# Patient Record
Sex: FEMALE | Race: BLACK OR AFRICAN AMERICAN | Employment: FULL TIME | URBAN - METROPOLITAN AREA
[De-identification: names, ages, dates, MRNs, and addresses within clinical notes are randomized per-mention and may not be internally consistent; named-entity substitution may affect disease eponyms.]

---

## 2018-01-25 ENCOUNTER — OFFICE VISIT (OUTPATIENT)
Dept: FAMILY MEDICINE CLINIC | Age: 23
End: 2018-01-25

## 2018-01-25 VITALS
BODY MASS INDEX: 22.36 KG/M2 | DIASTOLIC BLOOD PRESSURE: 77 MMHG | RESPIRATION RATE: 14 BRPM | SYSTOLIC BLOOD PRESSURE: 110 MMHG | HEIGHT: 65 IN | WEIGHT: 134.2 LBS | HEART RATE: 82 BPM | TEMPERATURE: 98.6 F | OXYGEN SATURATION: 98 %

## 2018-01-25 DIAGNOSIS — Z00.00 WELL ADULT EXAM: Primary | ICD-10-CM

## 2018-01-25 LAB
HCG URINE, QL. (POC): NEGATIVE
VALID INTERNAL CONTROL?: YES

## 2018-01-25 NOTE — PROGRESS NOTES
.  Chief Complaint   Patient presents with    Establish Care    Contraception    Knee Pain     . Jerry Leigh

## 2018-01-25 NOTE — PROGRESS NOTES
HPI  Aimee Valenzuela is a 25 y.o. female who presents to [facility]. It has been quite a while since her last doctor's visit. She thinks she was in middle school. She wants to make sure she is healthy. Is having occasional knee pain. Sprained her right knee a little school and now it bothers her if she crouches for too long. Ice helps Motrin else. The pain is only about an hour at its longest duration. Does not pop click and does not feel unstable. Hurts about every time she runs. Like to discuss contraception. She is sexually active, monogamous relationship. Currently using condoms for birth control. Has never been on any kind of birth control before. She is currently employed at an [employer]. Would like to go to college and wants to be a     PMHx:  Past Medical History:   Diagnosis Date    Cancer ([facility] [address]     skin tag removed pt states it was malignant       Meds:       Allergies:   Not on File    Smoker:  History   Smoking Status    Never Smoker   Smokeless Tobacco    Never Used       ETOH:   History   Alcohol Use    Yes       FH: No family history on file. ROS:   As listed in HPI. In addition:  Constitutional:   No headache, fever, fatigue, weight loss or weight gain      Cardiac:    No chest pain      Resp:   No cough or shortness of breath      Neuro   No loss of consciousness, dizziness, seizures      Physical Exam:  Blood pressure 110/77, pulse 82, temperature 98.6 °F (37 °C), resp. rate 14, height 5' 5\" (1.651 m), weight 134 lb 3.2 oz (60.9 kg), last menstrual period 01/08/2018, SpO2 98 %. GEN: No apparent distress. Alert and oriented and responds to all questions appropriately. NEUROLOGIC:  No focal neurologic deficits. Strength and sensation grossly intact. Coordination and gait grossly intact. EXT: Well perfused. No edema. SKIN: No obvious rashes.   Lungs clear to auscultation bilaterally  CV regular rate and rhythm no murmur  HEENT unremarkable except for a deviated septum to the right and mildly congested right nasal mucosa. No postnasal drip and shotty lymphadenopathy       Assessment and Plan     Well adult  Surveillance labs at her request    Need for contraception  We talked about her options and she would like to try IUD. She has done her own research and had some insightful questions about this. She is a good candidate but would need to be referred to GYN for this. She is also due for her first Pap smear which would be done in conjunction with the IUD. She is fairly sure she has not had her HPV shots. She does not know what vaccines she has had so we will recheck out to her former PCP. Consent signed. She changed her name in the last few years so will need to keep a look out for these records      ICD-10-CM ICD-9-CM    1. Well adult exam Z00.00 V70.0 AMB POC URINE PREGNANCY TEST, VISUAL COLOR COMPARISON      REFERRAL TO OBSTETRICS AND GYNECOLOGY      METABOLIC PANEL, COMPREHENSIVE      CBC WITH AUTOMATED DIFF      LIPID PANEL       AVS given.  Pt expressed understanding of instructions

## 2018-01-25 NOTE — PATIENT INSTRUCTIONS
Meniscus: Exercises  Your Care Instructions  Here are some examples of typical rehabilitation exercises for your condition. Start each exercise slowly. Ease off the exercise if you start to have pain. Your doctor or physical therapist will tell you when you can start these exercises and which ones will work best for you. How to do the exercises  Calf wall stretch    1. Stand facing a wall with your hands on the wall at about eye level. Put your affected leg about a step behind your other leg. 2. Keeping your back leg straight and your back heel on the floor, bend your front knee and gently bring your hip and chest toward the wall until you feel a stretch in the calf of your back leg. 3. Hold the stretch for at least 15 to 30 seconds. 4. Repeat 2 to 4 times. 5. Repeat steps 1 through 4, but this time keep your back knee bent. Hamstring wall stretch    1. Lie on your back in a doorway, with your good leg through the open door. 2. Slide your affected leg up the wall to straighten your knee. You should feel a gentle stretch down the back of your leg. 1. Do not arch your back. 2. Do not bend either knee. 3. Keep one heel touching the floor and the other heel touching the wall. Do not point your toes. 3. Hold the stretch for at least 1 minute. Then over time, try to lengthen the time you hold the stretch to as long as 6 minutes. 4. Repeat 2 to 4 times. 5. If you do not have a place to do this exercise in a doorway, there is another way to do it:  6. Lie on your back, and bend your affected leg. 7. Loop a towel under the ball and toes of that foot, and hold the ends of the towel in your hands. 8. Straighten your knee, and slowly pull back on the towel. You should feel a gentle stretch down the back of your leg. 9. Hold the stretch for at least 15 to 30 seconds. Or even better, hold the stretch for 1 minute if you can. 10. Repeat 2 to 4 times. Quad sets    1.  Sit with your leg straight and supported on the floor or a firm bed. (If you feel discomfort in the front or back of your knee, place a small towel roll under your knee.)  2. Tighten the muscles on top of your thigh by pressing the back of your knee flat down to the floor. (If you feel discomfort under your kneecap, place a small towel roll under your knee.)  3. Hold for about 6 seconds, then rest up to 10 seconds. 4. Do 8 to 12 repetitions several times a day. Straight-leg raises to the front    1. Lie on your back with your good knee bent so that your foot rests flat on the floor. Your injured leg should be straight. Make sure that your low back has a normal curve. You should be able to slip your flat hand in between the floor and the small of your back, with your palm touching the floor and your back touching the back of your hand. 2. Tighten the thigh muscles in the injured leg by pressing the back of your knee flat down to the floor. Hold your knee straight. 3. Keeping the thigh muscles tight, lift your injured leg up so that your heel is about 12 inches off the floor. Hold for 5 seconds and then lower slowly. 4. Do 8 to 12 repetitions. Straight-leg raises to the back    1. Lie on your stomach, and lift your leg straight up behind you (toward the ceiling). 2. Lift your toes about 6 inches off the floor, hold for about 6 seconds, then lower slowly. 3. Do 8 to 12 repetitions. Hamstring curls    1. Lie on your stomach with your knees straight. If your kneecap is uncomfortable, roll up a washcloth and put it under your leg just above your kneecap. 2. Lift the foot of your injured leg by bending the knee so that you bring the foot up toward your buttock. If this motion hurts, try it without bending your knee quite as far. This may help you avoid any painful motion. 3. Slowly lower your leg back to the floor. 4. Do 8 to 12 repetitions.   5. With permission from your doctor or physical therapist, you may also want to add a cuff weight to your ankle (not more than 5 pounds). With weight, you do not have to lift your leg more than 12 inches to get a hamstring workout. Wall slide with ball squeeze    1. Stand with your back against a wall and with your feet about shoulder-width apart. Your feet should be about 12 inches away from the wall. 2. Put a ball about the size of a soccer ball between your knees. Then slowly slide down the wall until your knees are bent about 20 to 30 degrees. 3. Tighten your thigh muscles by squeezing the ball between your knees. Hold that position for about 10 seconds, then stop squeezing. Rest for up to 10 seconds between repetitions. 4. Repeat 8 to 12 times. Heel raises    1. Stand with your feet a few inches apart, with your hands lightly resting on a counter or chair in front of you. 2. Slowly raise your heels off the floor while keeping your knees straight. 3. Hold for about 6 seconds, then slowly lower your heels to the floor. 4. Do 8 to 12 repetitions several times during the day. Heel dig bridging    Stop doing this exercise if it causes pain. 1. Lie on your back with both knees bent and your ankles bent so that only your heels are digging into the floor. Your knees should be bent about 90 degrees. 2. Then push your heels into the floor, squeeze your buttocks, and lift your hips off the floor until your shoulders, hips, and knees are all in a straight line. 3. Hold for about 6 seconds as you continue to breathe normally, and then slowly lower your hips back down to the floor and rest for up to 10 seconds. 4. Do 8 to 12 repetitions. Shallow standing knee bends    Do this exercise only if you have very little pain; if you have no clicking, locking, or giving way in the injured knee; and if it does not hurt while you are doing 8 to 12 repetitions. 1. Stand with your hands lightly resting on a counter or chair in front of you. Put your feet shoulder-width apart.   2. Slowly bend your knees so that you squat down like you are going to sit in a chair. Make sure your knees do not go in front of your toes. 3. Lower yourself about 6 inches. Your heels should remain on the floor at all times. 4. Rise slowly to a standing position. Follow-up care is a key part of your treatment and safety. Be sure to make and go to all appointments, and call your doctor if you are having problems. It's also a good idea to know your test results and keep a list of the medicines you take. Where can you learn more? Go to http://andrew-isela.info/. Enter C183 in the search box to learn more about \"Meniscus Tear: Exercises. \"  Current as of: March 21, 2017  Content Version: 11.4  © 9919-1712 Healthwise, [employer]. Care instructions adapted under license by [employer] (which disclaims liability or warranty for this information). If you have questions about a medical condition or this instruction, always ask your healthcare professional. [address] any warranty or liability for your use of this information.

## 2018-01-25 NOTE — MR AVS SNAPSHOT
[facility] 
 
 
 [address] [place] [address] 
[phone] Patient: Rd Naik MRN: [id number] :1995 Visit Information Date & Time Provider Department Dept. Phone Encounter #  
 2018  1:00 PM Jose Miguel Herrera MD [address] [facility] [phone] [id number] Upcoming Health Maintenance Date Due  
 HPV AGE 9Y-34Y (1 of 3 - Female 3 Dose Series) 2007 DTaP/Tdap/Td series (1 - Tdap) 2017 PAP AKA CERVICAL CYTOLOGY 2017 Influenza Age 5 to Adult 2017 Allergies as of 2018  Review Complete On: 2018 By: Jose Miguel Herrera MD  
 Not on File Current Immunizations  Never Reviewed No immunizations on file. Not reviewed this visit You Were Diagnosed With   
  
 Codes Comments Well adult exam    -  Primary ICD-10-CM: Z00.00 ICD-9-CM: V70.0 Vitals BP Pulse Temp Resp Height(growth percentile) Weight(growth percentile) 110/77 (BP 1 Location: Right arm, BP Patient Position: Sitting) 82 98.6 °F (37 °C) 14 5' 5\" (1.651 m) 134 lb 3.2 oz (60.9 kg) LMP SpO2 BMI Smoking Status 2018 98% 22.33 kg/m2 Never Smoker BMI and BSA Data Body Mass Index Body Surface Area  
 22.33 kg/m 2 1.67 m 2 Preferred Pharmacy Pharmacy Name Phone [address] [phone] Your Updated Medication List  
  
Notice  As of 2018  1:55 PM  
 You have not been prescribed any medications. We Performed the Following AMB POC URINE PREGNANCY TEST, VISUAL COLOR COMPARISON [98193 CPT(R)] CBC WITH AUTOMATED DIFF [91346 CPT(R)] LIPID PANEL [38661 CPT(R)] METABOLIC PANEL, COMPREHENSIVE [18889 CPT(R)] REFERRAL TO OBSTETRICS AND GYNECOLOGY [REF51 Custom] Referral Information Referral ID Referred By Referred To  
  
 [id number] Valdo SCHAEFER [address] [place], [address] [facility] Visits Status Start Date End Date 1 New Request 1/25/18 1/25/19 If your referral has a status of pending review or denied, additional information will be sent to support the outcome of this decision. Patient Instructions Meniscus: Exercises Your Care Instructions Here are some examples of typical rehabilitation exercises for your condition. Start each exercise slowly. Ease off the exercise if you start to have pain. Your doctor or physical therapist will tell you when you can start these exercises and which ones will work best for you. How to do the exercises Calf wall stretch 1. Stand facing a wall with your hands on the wall at about eye level. Put your affected leg about a step behind your other leg. 2. Keeping your back leg straight and your back heel on the floor, bend your front knee and gently bring your hip and chest toward the wall until you feel a stretch in the calf of your back leg. 3. Hold the stretch for at least 15 to 30 seconds. 4. Repeat 2 to 4 times. 5. Repeat steps 1 through 4, but this time keep your back knee bent. Hamstring wall stretch 1. Lie on your back in a doorway, with your good leg through the open door. 2. Slide your affected leg up the wall to straighten your knee. You should feel a gentle stretch down the back of your leg. 1. Do not arch your back. 2. Do not bend either knee. 3. Keep one heel touching the floor and the other heel touching the wall. Do not point your toes. 3. Hold the stretch for at least 1 minute. Then over time, try to lengthen the time you hold the stretch to as long as 6 minutes. 4. Repeat 2 to 4 times. 5. If you do not have a place to do this exercise in a doorway, there is another way to do it: 6. Lie on your back, and bend your affected leg. 7. Loop a towel under the ball and toes of that foot, and hold the ends of the towel in your hands. 8. Straighten your knee, and slowly pull back on the towel. You should feel a gentle stretch down the back of your leg. 9. Hold the stretch for at least 15 to 30 seconds. Or even better, hold the stretch for 1 minute if you can. 10. Repeat 2 to 4 times. [facility] 1. Sit with your leg straight and supported on the floor or a firm bed. (If you feel discomfort in the front or back of your knee, place a small towel roll under your knee.) 2. Tighten the muscles on top of your thigh by pressing the back of your knee flat down to the floor. (If you feel discomfort under your kneecap, place a small towel roll under your knee.) 3. Hold for about 6 seconds, then rest up to 10 seconds. 4. Do 8 to 12 repetitions several times a day. Straight-leg raises to the front 1. Lie on your back with your good knee bent so that your foot rests flat on the floor. Your injured leg should be straight. Make sure that your low back has a normal curve. You should be able to slip your flat hand in between the floor and the small of your back, with your palm touching the floor and your back touching the back of your hand. 2. Tighten the thigh muscles in the injured leg by pressing the back of your knee flat down to the floor. Hold your knee straight. 3. Keeping the thigh muscles tight, lift your injured leg up so that your heel is about 12 inches off the floor. Hold for 5 seconds and then lower slowly. 4. Do 8 to 12 repetitions. Straight-leg raises to the back 1. Lie on your stomach, and lift your leg straight up behind you (toward the ceiling). 2. Lift your toes about 6 inches off the floor, hold for about 6 seconds, then lower slowly. 3. Do 8 to 12 repetitions. Hamstring curls 1. Lie on your stomach with your knees straight. If your kneecap is uncomfortable, roll up a washcloth and put it under your leg just above your kneecap.  
2. Lift the foot of your injured leg by bending the knee so that you bring the foot up toward your buttock. If this motion hurts, try it without bending your knee quite as far. This may help you avoid any painful motion. 3. Slowly lower your leg back to the floor. 4. Do 8 to 12 repetitions. 5. With permission from your doctor or physical therapist, you may also want to add a cuff weight to your ankle (not more than 5 pounds). With weight, you do not have to lift your leg more than 12 inches to get a hamstring workout. Wall slide with ball squeeze 1. Stand with your back against a wall and with your feet about shoulder-width apart. Your feet should be about 12 inches away from the wall. 2. Put a ball about the size of a soccer ball between your knees. Then slowly slide down the wall until your knees are bent about 20 to 30 degrees. 3. Tighten your thigh muscles by squeezing the ball between your knees. Hold that position for about 10 seconds, then stop squeezing. Rest for up to 10 seconds between repetitions. 4. Repeat 8 to 12 times. Heel raises 1. Stand with your feet a few inches apart, with your hands lightly resting on a counter or chair in front of you. 2. Slowly raise your heels off the floor while keeping your knees straight. 3. Hold for about 6 seconds, then slowly lower your heels to the floor. 4. Do 8 to 12 repetitions several times during the day. Heel dig bridging Stop doing this exercise if it causes pain. 1. Lie on your back with both knees bent and your ankles bent so that only your heels are digging into the floor. Your knees should be bent about 90 degrees. 2. Then push your heels into the floor, squeeze your buttocks, and lift your hips off the floor until your shoulders, hips, and knees are all in a straight line. 3. Hold for about 6 seconds as you continue to breathe normally, and then slowly lower your hips back down to the floor and rest for up to 10 seconds. 4. Do 8 to 12 repetitions. Shallow standing knee bends Do this exercise only if you have very little pain; if you have no clicking, locking, or giving way in the injured knee; and if it does not hurt while you are doing 8 to 12 repetitions. 1. Stand with your hands lightly resting on a counter or chair in front of you. Put your feet shoulder-width apart. 2. Slowly bend your knees so that you squat down like you are going to sit in a chair. Make sure your knees do not go in front of your toes. 3. Lower yourself about 6 inches. Your heels should remain on the floor at all times. 4. Rise slowly to a standing position. Follow-up care is a key part of your treatment and safety. Be sure to make and go to all appointments, and call your doctor if you are having problems. It's also a good idea to know your test results and keep a list of the medicines you take. Where can you learn more? Go to http://andrew-isela.info/. Enter C183 in the search box to learn more about \"Meniscus Tear: Exercises. \" Current as of: March 21, 2017 Content Version: 11.4 © 5959-6792 [employer]. Care instructions adapted under license by [employer] (which disclaims liability or warranty for this information). If you have questions about a medical condition or this instruction, always ask your healthcare professional. [address] any warranty or liability for your use of this information. Introducing [facility] HE[facility]! [facility] introduces [employer] patient portal. Now you can access parts of your medical record, email your doctor's office, and request medication refills online. 1. In your internet browser, go to [email or web address] 2. Click on the First Time User? Click Here link in the Sign In box. You will see the New Member Sign Up page. 3. Enter your [employer] Access Code exactly as it appears below. You will not need to use this code after youve completed the sign-up process.  If you do not sign up before the expiration date, you must request a new code. · Giftbar Access Code: [email or web address] Expires: 4/25/2018  1:03 PM 
 
4. Enter the last four digits of your Social Security Number (xxxx) and Date of Birth (mm/dd/yyyy) as indicated and click Submit. You will be taken to the next sign-up page. 5. Create a Giftbar ID. This will be your Giftbar login ID and cannot be changed, so think of one that is secure and easy to remember. 6. Create a Giftbar password. You can change your password at any time. 7. Enter your Password Reset Question and Answer. This can be used at a later time if you forget your password. 8. Enter your e-mail address. You will receive e-mail notification when new information is available in [address]. 9. Click Sign Up. You can now view and download portions of your medical record. 10. Click the Download Summary menu link to download a portable copy of your medical information. If you have questions, please visit the Frequently Asked Questions section of the Giftbar website. Remember, Giftbar is NOT to be used for urgent needs. For medical emergencies, dial 911. Now available from your iPhone and Android! Please provide this summary of care documentation to your next provider. If you have any questions after today's visit, please call [phone].

## 2018-01-26 LAB
ALBUMIN SERPL-MCNC: 4.4 G/DL (ref 3.5–5.5)
ALBUMIN/GLOB SERPL: 1.2 {RATIO} (ref 1.2–2.2)
ALP SERPL-CCNC: 66 IU/L (ref 39–117)
ALT SERPL-CCNC: 17 IU/L (ref 0–32)
AST SERPL-CCNC: 19 IU/L (ref 0–40)
BASOPHILS # BLD AUTO: 0 X10E3/UL (ref 0–0.2)
BASOPHILS NFR BLD AUTO: 0 %
BILIRUB SERPL-MCNC: 0.3 MG/DL (ref 0–1.2)
BUN SERPL-MCNC: 7 MG/DL (ref 6–20)
BUN/CREAT SERPL: 11 (ref 9–23)
CALCIUM SERPL-MCNC: 9.6 MG/DL (ref 8.7–10.2)
CHLORIDE SERPL-SCNC: 99 MMOL/L (ref 96–106)
CHOLEST SERPL-MCNC: 182 MG/DL (ref 100–199)
CO2 SERPL-SCNC: 27 MMOL/L (ref 18–29)
CREAT SERPL-MCNC: 0.65 MG/DL (ref 0.57–1)
EOSINOPHIL # BLD AUTO: 0.2 X10E3/UL (ref 0–0.4)
EOSINOPHIL NFR BLD AUTO: 2 %
ERYTHROCYTE [DISTWIDTH] IN BLOOD BY AUTOMATED COUNT: 13.2 % (ref 12.3–15.4)
GFR SERPLBLD CREATININE-BSD FMLA CKD-EPI: 127 ML/MIN/1.73
GFR SERPLBLD CREATININE-BSD FMLA CKD-EPI: 147 ML/MIN/1.73
GLOBULIN SER CALC-MCNC: 3.6 G/DL (ref 1.5–4.5)
GLUCOSE SERPL-MCNC: 81 MG/DL (ref 65–99)
HCT VFR BLD AUTO: 39.4 % (ref 34–46.6)
HDLC SERPL-MCNC: 64 MG/DL
HGB BLD-MCNC: 12.1 G/DL (ref 11.1–15.9)
IMM GRANULOCYTES # BLD: 0 X10E3/UL (ref 0–0.1)
IMM GRANULOCYTES NFR BLD: 0 %
INTERPRETATION, 910389: NORMAL
LDLC SERPL CALC-MCNC: 107 MG/DL (ref 0–99)
LYMPHOCYTES # BLD AUTO: 1.9 X10E3/UL (ref 0.7–3.1)
LYMPHOCYTES NFR BLD AUTO: 24 %
MCH RBC QN AUTO: 28.1 PG (ref 26.6–33)
MCHC RBC AUTO-ENTMCNC: 30.7 G/DL (ref 31.5–35.7)
MCV RBC AUTO: 92 FL (ref 79–97)
MONOCYTES # BLD AUTO: 0.9 X10E3/UL (ref 0.1–0.9)
MONOCYTES NFR BLD AUTO: 11 %
NEUTROPHILS # BLD AUTO: 5.2 X10E3/UL (ref 1.4–7)
NEUTROPHILS NFR BLD AUTO: 63 %
PLATELET # BLD AUTO: 298 X10E3/UL (ref 150–379)
POTASSIUM SERPL-SCNC: 4.3 MMOL/L (ref 3.5–5.2)
PROT SERPL-MCNC: 8 G/DL (ref 6–8.5)
RBC # BLD AUTO: 4.3 X10E6/UL (ref 3.77–5.28)
SODIUM SERPL-SCNC: 139 MMOL/L (ref 134–144)
TRIGL SERPL-MCNC: 54 MG/DL (ref 0–149)
VLDLC SERPL CALC-MCNC: 11 MG/DL (ref 5–40)
WBC # BLD AUTO: 8.2 X10E3/UL (ref 3.4–10.8)

## 2021-01-05 ENCOUNTER — OFFICE VISIT (OUTPATIENT)
Dept: PRIMARY CARE CLINIC | Age: 26
End: 2021-01-05
Payer: COMMERCIAL

## 2021-01-05 VITALS
TEMPERATURE: 97 F | HEART RATE: 106 BPM | SYSTOLIC BLOOD PRESSURE: 118 MMHG | OXYGEN SATURATION: 98 % | DIASTOLIC BLOOD PRESSURE: 68 MMHG

## 2021-01-05 DIAGNOSIS — R05.9 COUGH: ICD-10-CM

## 2021-01-05 DIAGNOSIS — Z13.89 SCREENING FOR CARDIOVASCULAR, RESPIRATORY, AND GENITOURINARY DISEASES: Primary | ICD-10-CM

## 2021-01-05 DIAGNOSIS — Z13.6 SCREENING FOR CARDIOVASCULAR, RESPIRATORY, AND GENITOURINARY DISEASES: Primary | ICD-10-CM

## 2021-01-05 DIAGNOSIS — Z13.83 SCREENING FOR CARDIOVASCULAR, RESPIRATORY, AND GENITOURINARY DISEASES: Primary | ICD-10-CM

## 2021-01-05 DIAGNOSIS — R68.83 CHILLS (WITHOUT FEVER): ICD-10-CM

## 2021-01-05 PROCEDURE — 99202 OFFICE O/P NEW SF 15 MIN: CPT | Performed by: NURSE PRACTITIONER

## 2021-01-05 NOTE — PROGRESS NOTES
Mary Ellen Kimble is a 26 y.o. female who was seen in clinic today (1/5/2021) for an acute visit. Assessment/Plan:            * COVID-19 sample collected and submitted       * Patient given detailed CDC instructions contained within After Visit Summary    Diagnoses and all orders for this visit:    1. Screening for cardiovascular, respiratory, and genitourinary diseases  -     NOVEL CORONAVIRUS (COVID-19)    2. Cough    3. Chills (without fever)       known covid exposure. Discussed expected course/resolution/complications of diagnosis in detail with patient. Advised pt on CDC guidance, OTC medications for symptom management, red flags reviewed and should develop to seek emergency medical attn. Reviewed with her that COVID-19 pandemic is an evolving situation with rapidly changing recommendations & guidelines, continue to practice hand hygiene, social distancing, wearing of facial coverings. Regardless of testing results, should still follow CDC guidelines as there is a chance of a false negative, such as a poor sample collection or being too soon to test after exposure. Medical decisions are made based on the the best information available at the time. Recommended she stay tuned for updates published by trusted sources and to advise your PCP of any unexpected changes in clinical condition     Subjective:   Fanny was seen today for Concern For COVID-19 (Coronavirus)  Subjective Fever; Chills;Cough; Abdominal pain;Muscle pain;Severe headache; Fatigue;Diarrhea. Has been taking tylenol cold and flu with some relief of symptoms. She denies a recent history/current: loss of smell/taste, objective fever, wheezing, SOB, and MILTON. Non user of tob. Travel Screening     Question   Response    In the last month, have you been in contact with someone who was confirmed or suspected to have Coronavirus / COVID-19? No / Unsure    Have you had a COVID-19 viral test in the last 14 days?   No    Do you have any of the following new or worsening symptoms? Fever; Chills;Cough; Abdominal pain;Muscle pain;Severe headache; Fatigue;Diarrhea    Have you traveled internationally in the last month? No      Travel History   Travel since 12/05/20     No documented travel since 12/05/20          ROS - Pertinent items are noted in HPI    There is no problem list on file for this patient. Home Medications    Not on File      Not on File       Objective:   Physical Exam  General:  alert, cooperative, acutely ill   Ears: Normal external ear canals AU   Sinuses: Normal paranasal sinuses without tenderness   Neck: supple, symmetrical, trachea midline. Heart: normal rate, regular rhythm   Lungs: No dyspneic or audible respiratory distress. Visit Vitals  /68 (BP 1 Location: Left arm, BP Patient Position: Sitting)   Pulse (!) 106   Temp 97 °F (36.1 °C)   SpO2 98%         Disclaimer:        Medication risks/benefits/costs/interactions/alternatives discussed with patient. She was given an after visit summary which includes diagnoses, current medications, & vitals. She expressed understanding with the diagnosis and plan. Aspects of this note may have been generated using voice recognition software. Despite editing, there may be some syntax errors.        Bismark Ragsdale NP

## 2021-01-07 LAB — SARS-COV-2, NAA: NOT DETECTED

## 2021-05-20 ENCOUNTER — OFFICE VISIT (OUTPATIENT)
Dept: URGENT CARE | Age: 26
End: 2021-05-20
Payer: COMMERCIAL

## 2021-05-20 VITALS
SYSTOLIC BLOOD PRESSURE: 124 MMHG | DIASTOLIC BLOOD PRESSURE: 83 MMHG | RESPIRATION RATE: 18 BRPM | OXYGEN SATURATION: 100 % | TEMPERATURE: 98.2 F | HEART RATE: 80 BPM

## 2021-05-20 DIAGNOSIS — Z20.2 EXPOSURE TO CHLAMYDIA: Primary | ICD-10-CM

## 2021-05-20 PROCEDURE — S9083 URGENT CARE CENTER GLOBAL: HCPCS | Performed by: FAMILY MEDICINE

## 2021-05-20 RX ORDER — LEVONORGESTREL 19.5 MG/1
INTRAUTERINE DEVICE INTRAUTERINE
COMMUNITY

## 2021-05-20 RX ORDER — AZITHROMYCIN 250 MG/1
1000 TABLET, FILM COATED ORAL
Qty: 4 TABLET | Refills: 0 | Status: SHIPPED | OUTPATIENT
Start: 2021-05-20 | End: 2021-05-20

## 2021-05-20 NOTE — PROGRESS NOTES
Donny Wilson is a 23 y.o. female who presents with exposure to Chlamydia, was notified by partner yesterday. Denies abnormal vaginal d/c, abdominal pain, N/V, fever. The history is provided by the patient. Past Medical History:   Diagnosis Date    Cancer [facility])     skin tag removed pt states it was malignant        History reviewed. No pertinent surgical history. History reviewed. No pertinent family history. Social History     Socioeconomic History    Marital status: SINGLE     Spouse name: Not on file    Number of children: Not on file    Years of education: Not on file    Highest education level: Not on file   Occupational History    Not on file   Tobacco Use    Smoking status: Never Smoker    Smokeless tobacco: Never Used   Substance and Sexual Activity    Alcohol use: Yes    Drug use: No    Sexual activity: Yes   Other Topics Concern    Not on file   Social History Narrative    Not on file     Social Determinants of Health     Financial Resource Strain:     Difficulty of Paying Living Expenses:    Food Insecurity:     Worried About Running Out of Food in the Last Year:     [address] in the Last Year:    Transportation Needs:     Lack of Transportation (Medical):  Lack of Transportation (Non-Medical):    Physical Activity:     Days of Exercise per Week:     Minutes of Exercise per Session:    Stress:     Feeling of Stress :    Social Connections:     Frequency of Communication with Friends and Family:     Frequency of Social Gatherings with Friends and Family:     Attends [employer] Services:     Active Member of Clubs or Organizations:     Attends Club or Organization Meetings:     Marital Status:    Intimate Partner Violence:     Fear of Current or Ex-Partner:     Emotionally Abused:     Physically Abused:     Sexually Abused: ALLERGIES: Patient has no known allergies. Review of Systems   Constitutional: Negative for fever. Gastrointestinal: Negative for abdominal pain, nausea and vomiting. Genitourinary: Negative for vaginal discharge and vaginal pain. Vitals:    05/20/21 1056   BP: 124/83   Pulse: 80   Resp: 18   Temp: 98.2 °F (36.8 °C)   SpO2: 100%       Physical Exam  Vitals and nursing note reviewed. Constitutional:       General: She is not in acute distress. Appearance: She is well-developed. She is not diaphoretic. Pulmonary:      Effort: Pulmonary effort is normal.   Neurological:      Mental Status: She is alert. Psychiatric:         Behavior: Behavior normal.         Thought Content: Thought content normal.         Judgment: Judgment normal.         MDM    ICD-10-CM ICD-9-CM   1. Exposure to chlamydia  Z20.2 V01.6       Orders Placed This Encounter    CT/NG/T.VAGINALIS AMPLIFICATION     Standing Status:   Future     Number of Occurrences:   1     Standing Expiration Date:   11/20/2021     Order Specific Question:   Specimen source     Answer:   Vaginal [516]    DISCONTD: azithromycin (ZITHROMAX) 250 mg tablet     Sig: Take 4 Tablets by mouth now for 1 dose. Dispense:  4 Tablet     Refill:  0    azithromycin (ZITHROMAX) 250 mg tablet     Sig: Take 4 Tablets by mouth now for 1 dose. Dispense:  4 Tablet     Refill:  0      Take Azithromycin as directed  Patient self swabbed; patient reports abnormal discharge noted after swabbing    If signs and symptoms become worse the pt is to go to the ER.          Procedures

## 2021-05-20 NOTE — PATIENT INSTRUCTIONS
Chlamydia: Care Instructions Your Care Instructions Chlamydia is a bacterial infection spread through sexual contact. It's one of the most common sexually transmitted infections (STIs). Most people who get chlamydia don't have symptoms. But they can still infect their sex partners. If chlamydia in women is not treated, it can cause pelvic inflammatory disease (PID), a severe pelvic infection. PID can make it hard for a woman to get pregnant. Antibiotics can cure chlamydia. Your sex partner or partners also need treatment to keep from spreading the infection. Tell your doctor if you might be pregnant. Certain antibiotics should not be used in pregnancy. Follow-up care is a key part of your treatment and safety. Be sure to make and go to all appointments, and call your doctor if you are having problems. It's also a good idea to know your test results and keep a list of the medicines you take. How can you care for yourself at home? · Chlamydia often is treated with a single dose of antibiotics in the doctor's office. If your doctor prescribed antibiotics to take at home, take them as directed. Do not stop taking them just because you feel better. You need to take the full course of antibiotics. · Do not have sex with anyone while you are being treated. If your treatment is a single dose of antibiotics, wait at least 7 days after you take the dose before you have sex. Even if you use a condom, you and your partner may pass the infection back and forth. · Make sure to tell your sex partner or partners that you have chlamydia. They should get treated, even if they do not have symptoms. · Your doctor may have done tests for other STIs. · Your doctor may advise you to be tested again for chlamydia in 3 or 4 months. How can you prevent it? It's easier to prevent an STI than it is to treat one: · Limit your sex partners. The safest sex is with one partner who has sex only with you.  
· Talk with your partner or partners about STIs before you have sex. Find out if they are at risk for an STI. Remember that it's possible to have an STI and not know it. · Wait to have sex with new partners until you've each been tested. · Don't have sex if you have symptoms of an infection or if you are being treated for an STI. · Use a condom (a male or female condom) every time you have sex. Condoms are the only form of birth control that also helps prevent STIs. · If you're pregnant, be extra careful. Some STIs can be passed to your baby during delivery. Vaccines are available for some STIs, such as HPV. Ask your doctor for more information. When should you call for help? Call 911 anytime you think you may need emergency care. For example, call if: 
  · You have sudden, severe pain in your belly or pelvis. Call your doctor now or seek immediate medical care if: 
  · You have new belly or pelvic pain.  
  · You have a fever.  
  · You have new or increased burning or pain with urination, or you cannot urinate.  
  · You have pain, swelling, or tenderness in the scrotum. Watch closely for changes in your health, and be sure to contact your doctor if: 
  · You have unusual vaginal bleeding.  
  · You have a discharge from the vagina or penis.  
  · You think you may have been exposed to another STI.  
  · Your symptoms get worse or have not improved within 1 week after starting treatment.  
  · You have any new symptoms, such as sores, bumps, rashes, blisters, or warts in the genital or anal area. Where can you learn more? Go to http://www.gray.com/ Enter G762 in the search box to learn more about \"Chlamydia: Care Instructions. \" Current as of: February 26, 2020               Content Version: 12.8 © 5211-2922 [employer]. Care instructions adapted under license by [employer] (which disclaims liability or warranty for this information).  If you have questions about a medical condition or this instruction, always ask your healthcare professional. [place] [address] any warranty or liability for your use of this information.

## 2021-05-24 LAB
A VAGINAE DNA VAG QL NAA+PROBE: ABNORMAL SCORE
BVAB2 DNA VAG QL NAA+PROBE: ABNORMAL SCORE
C ALBICANS DNA VAG QL NAA+PROBE: NEGATIVE
C GLABRATA DNA VAG QL NAA+PROBE: NEGATIVE
MEGA1 DNA VAG QL NAA+PROBE: ABNORMAL SCORE
SPECIMEN SOURCE: ABNORMAL
T VAGINALIS RRNA SPEC QL NAA+PROBE: NEGATIVE

## 2021-05-24 NOTE — PROGRESS NOTES
name and  confirmed   Informed pt of lab results, pt denies any vaginal discharge with odor. Pt verbally understood.

## 2021-05-24 NOTE — PROGRESS NOTES
Notify patient about intermediate result for BV in swab done   Will sent Flagyl if she has vaginal discharge with odor    No vaginal yeast or Trichomonas     Please check for GC/ Chlamydia- was it ordered ? Or still pending ?

## 2023-05-22 RX ORDER — LEVONORGESTREL 19.5 MG/1
INTRAUTERINE DEVICE INTRAUTERINE
COMMUNITY